# Patient Record
Sex: FEMALE
[De-identification: names, ages, dates, MRNs, and addresses within clinical notes are randomized per-mention and may not be internally consistent; named-entity substitution may affect disease eponyms.]

---

## 2023-10-30 PROBLEM — Z00.00 ENCOUNTER FOR PREVENTIVE HEALTH EXAMINATION: Status: ACTIVE | Noted: 2023-10-30

## 2023-11-02 ENCOUNTER — APPOINTMENT (OUTPATIENT)
Dept: ULTRASOUND IMAGING | Facility: CLINIC | Age: 53
End: 2023-11-02

## 2023-11-02 ENCOUNTER — APPOINTMENT (OUTPATIENT)
Dept: MRI IMAGING | Facility: CLINIC | Age: 53
End: 2023-11-02

## 2023-11-02 ENCOUNTER — APPOINTMENT (OUTPATIENT)
Dept: MAMMOGRAPHY | Facility: CLINIC | Age: 53
End: 2023-11-02
Payer: MEDICAID

## 2023-11-02 PROCEDURE — A9585: CPT

## 2023-11-02 PROCEDURE — 70553 MRI BRAIN STEM W/O & W/DYE: CPT

## 2023-11-02 PROCEDURE — 77063 BREAST TOMOSYNTHESIS BI: CPT

## 2023-11-02 PROCEDURE — 76641 ULTRASOUND BREAST COMPLETE: CPT | Mod: 50

## 2023-11-02 PROCEDURE — 77067 SCR MAMMO BI INCL CAD: CPT

## 2023-11-02 PROCEDURE — 76700 US EXAM ABDOM COMPLETE: CPT

## 2023-11-07 ENCOUNTER — APPOINTMENT (OUTPATIENT)
Dept: ULTRASOUND IMAGING | Facility: CLINIC | Age: 53
End: 2023-11-07
Payer: MEDICAID

## 2023-11-07 PROCEDURE — 93970 EXTREMITY STUDY: CPT

## 2023-11-15 ENCOUNTER — APPOINTMENT (OUTPATIENT)
Dept: ULTRASOUND IMAGING | Facility: CLINIC | Age: 53
End: 2023-11-15
Payer: MEDICAID

## 2023-11-15 PROCEDURE — 76642 ULTRASOUND BREAST LIMITED: CPT | Mod: LT

## 2023-12-05 ENCOUNTER — APPOINTMENT (OUTPATIENT)
Dept: NEUROSURGERY | Facility: CLINIC | Age: 53
End: 2023-12-05
Payer: MEDICAID

## 2023-12-05 VITALS
DIASTOLIC BLOOD PRESSURE: 78 MMHG | RESPIRATION RATE: 16 BRPM | OXYGEN SATURATION: 98 % | HEIGHT: 66 IN | HEART RATE: 74 BPM | WEIGHT: 211.64 LBS | SYSTOLIC BLOOD PRESSURE: 126 MMHG | BODY MASS INDEX: 34.01 KG/M2

## 2023-12-05 PROCEDURE — 99203 OFFICE O/P NEW LOW 30 MIN: CPT

## 2023-12-12 ENCOUNTER — APPOINTMENT (OUTPATIENT)
Dept: RADIATION ONCOLOGY | Facility: CLINIC | Age: 53
End: 2023-12-12
Payer: MEDICAID

## 2023-12-12 VITALS
TEMPERATURE: 97 F | HEIGHT: 66 IN | RESPIRATION RATE: 15 BRPM | BODY MASS INDEX: 33.91 KG/M2 | OXYGEN SATURATION: 97 % | DIASTOLIC BLOOD PRESSURE: 77 MMHG | WEIGHT: 211 LBS | HEART RATE: 76 BPM | SYSTOLIC BLOOD PRESSURE: 113 MMHG

## 2023-12-12 DIAGNOSIS — Z80.42 FAMILY HISTORY OF MALIGNANT NEOPLASM OF PROSTATE: ICD-10-CM

## 2023-12-12 DIAGNOSIS — Z78.9 OTHER SPECIFIED HEALTH STATUS: ICD-10-CM

## 2023-12-12 DIAGNOSIS — Z92.3 PERSONAL HISTORY OF IRRADIATION: ICD-10-CM

## 2023-12-12 DIAGNOSIS — F17.200 NICOTINE DEPENDENCE, UNSPECIFIED, UNCOMPLICATED: ICD-10-CM

## 2023-12-12 PROCEDURE — 99205 OFFICE O/P NEW HI 60 MIN: CPT

## 2023-12-12 PROCEDURE — T1013A: CUSTOM

## 2023-12-14 NOTE — ASSESSMENT
[FreeTextEntry1] : Ms. Garrison presents with above history and imaging. She is neurologically intact,  I have personally reviewed her MRI head w/wo contrast  for meningioma     Plan: Radiation oncology with Dr. Haas f/u as needed Patient has been given an opportunity to ask and have their questions answered to their satisfaction. Patient knows to call the office if there are any new or worsening symptoms.

## 2023-12-14 NOTE — HISTORY OF PRESENT ILLNESS
[FreeTextEntry1] : headache [de-identified] : SHERON DODD is a 53 year old female presents for initial neurosurgical evaluation of meningioma.  53 year old woman presents today for consultation regarding management for meningioma.  Her past medical history is significant for leukemia at age 9. Unclear whether she received cranial irradiation at that time.  She reports presenting with severe headaches in 2017, at which time she was diagnosed with meningioma.  She underwent neurosurgical resection of a reportedly large meningioma along the falx, and reports that it was a subtotal resection due to nearby vasculature. She had a copy of the pathology report on her phone, which reported meningioma WHO 1.  She additionally underwent radiosurgery in 2018. She describes this as a 1 day treatment, during which a frame was secured to her head. She is otherwise not aware of the specifics of the treatment received.

## 2023-12-14 NOTE — DATA REVIEWED
[de-identified] : EXAM: [id number] - MR BRAIN WAW IC - ORDERED BY: CANDELARIA MICHAEL   PROCEDURE DATE: 11/02/2023    INTERPRETATION: CLINICAL INDICATION: History of meningioma.  TECHNIQUE: Multi-planar multi-sequential MR imaging of the brain was performed before and after the intravenous administration of contrast. Gadavist IV contrast dose: 10 cc administered.  COMPARISON: Outside MRI brain 2/24/2022.  FINDINGS: Posterior midline craniotomy noted for prior lesion resection. There is mild increase in size of residual enhancing parasagittal lesion measuring up to 3.0 x 1.5 cm in axial plane, previously 2.6 x 1.4 cm. There is complete invasion of the middle third superior sagittal sinus again noted. Adjacent parenchymal encephalomalacia/gliosis is again seen.  1.1 x 1.0 cm enhancing lesion along the medial aspect of the right tentorium, grossly stable to slightly increased in size (11-10).  0.4 x 0.4 cm enhancing posterior parafalcine lesion, grossly stable (11-14, 101-68).  1.4 x 0.6 cm T1 hyperintense anterior parafalcine lesion is most compatible with lipoma or osteoma, grossly stable (11-17).  No acute infarct or intracranial hemorrhage. No hydrocephalus. No extra-axial fluid collections. The skull base flow voids are present.  The visualized intraorbital contents are normal. The imaged portions of the paranasal sinuses are clear. The mastoid air cells are clear.  IMPRESSION:  Mild increase in size of posterior parasagittal lesion which invades the superior sagittal sinus.  Grossly stable to slight increase size of 1.1 cm right tentorial lesion.  Grossly stable 0.4 cm posterior parafalcine lesion.

## 2023-12-21 ENCOUNTER — APPOINTMENT (OUTPATIENT)
Dept: NEUROLOGY | Facility: CLINIC | Age: 53
End: 2023-12-21

## 2024-02-20 ENCOUNTER — APPOINTMENT (OUTPATIENT)
Dept: NEUROSURGERY | Facility: CLINIC | Age: 54
End: 2024-02-20
Payer: COMMERCIAL

## 2024-02-20 VITALS
SYSTOLIC BLOOD PRESSURE: 113 MMHG | HEART RATE: 67 BPM | RESPIRATION RATE: 16 BRPM | OXYGEN SATURATION: 98 % | DIASTOLIC BLOOD PRESSURE: 78 MMHG | HEIGHT: 66 IN | BODY MASS INDEX: 31.82 KG/M2 | TEMPERATURE: 97 F | WEIGHT: 198 LBS

## 2024-02-20 DIAGNOSIS — D32.9 BENIGN NEOPLASM OF MENINGES, UNSPECIFIED: ICD-10-CM

## 2024-02-20 PROCEDURE — 99214 OFFICE O/P EST MOD 30 MIN: CPT

## 2024-02-21 NOTE — REASON FOR VISIT
[Follow-Up: _____] : a [unfilled] follow-up visit [New Patient Visit] : a new patient visit [Referred By: _________] : Patient was referred by SUSANA [FreeTextEntry1] : meningioma

## 2024-02-21 NOTE — DATA REVIEWED
[de-identified] : EXAM: [id number] - MR BRAIN WAW IC - ORDERED BY: CANDELARIA MICHAEL   PROCEDURE DATE: 11/02/2023    INTERPRETATION: CLINICAL INDICATION: History of meningioma.  TECHNIQUE: Multi-planar multi-sequential MR imaging of the brain was performed before and after the intravenous administration of contrast. Gadavist IV contrast dose: 10 cc administered.  COMPARISON: Outside MRI brain 2/24/2022.  FINDINGS: Posterior midline craniotomy noted for prior lesion resection. There is mild increase in size of residual enhancing parasagittal lesion measuring up to 3.0 x 1.5 cm in axial plane, previously 2.6 x 1.4 cm. There is complete invasion of the middle third superior sagittal sinus again noted. Adjacent parenchymal encephalomalacia/gliosis is again seen.  1.1 x 1.0 cm enhancing lesion along the medial aspect of the right tentorium, grossly stable to slightly increased in size (11-10).  0.4 x 0.4 cm enhancing posterior parafalcine lesion, grossly stable (11-14, 101-68).  1.4 x 0.6 cm T1 hyperintense anterior parafalcine lesion is most compatible with lipoma or osteoma, grossly stable (11-17).  No acute infarct or intracranial hemorrhage. No hydrocephalus. No extra-axial fluid collections. The skull base flow voids are present.  The visualized intraorbital contents are normal. The imaged portions of the paranasal sinuses are clear. The mastoid air cells are clear.  IMPRESSION:  Mild increase in size of posterior parasagittal lesion which invades the superior sagittal sinus.  Grossly stable to slight increase size of 1.1 cm right tentorial lesion.  Grossly stable 0.4 cm posterior parafalcine lesion.

## 2024-02-21 NOTE — REVIEW OF SYSTEMS
[As Noted in HPI] : as noted in HPI [Negative] : Heme/Lymph [Numbness] : no numbness [Tingling] : no tingling [Difficulty Walking] : no difficulty walking

## 2024-02-21 NOTE — HISTORY OF PRESENT ILLNESS
[FreeTextEntry1] : headache [de-identified] : SHERON DODD is a 53 year old female presents for initial neurosurgical evaluation of meningioma.  53 year old woman presents today for consultation regarding management for meningioma.  Her past medical history is significant for leukemia at age 9. Unclear whether she received cranial irradiation at that time.  She reports presenting with severe headaches in 2017, at which time she was diagnosed with meningioma.  She underwent neurosurgical resection of a reportedly large meningioma along the falx, and reports that it was a subtotal resection due to nearby vasculature. She had a copy of the pathology report on her phone, which reported meningioma WHO 1.  She additionally underwent radiosurgery in 2018. She describes this as a 1 day treatment, during which a frame was secured to her head. She is otherwise not aware of the specifics of the treatment received.

## 2024-02-21 NOTE — PHYSICAL EXAM
[General Appearance - Alert] : alert [General Appearance - In No Acute Distress] : in no acute distress [Oriented To Time, Place, And Person] : oriented to person, place, and time [Impaired Insight] : insight and judgment were intact [Affect] : the affect was normal [Person] : oriented to person [Place] : oriented to place [Time] : oriented to time [Short Term Intact] : short term memory intact [Remote Intact] : remote memory intact [Span Intact] : the attention span was normal [Concentration Intact] : normal concentrating ability [Fluency] : fluency intact [Comprehension] : comprehension intact [Current Events] : adequate knowledge of current events [Past History] : adequate knowledge of personal past history [Vocabulary] : adequate range of vocabulary [Cranial Nerves Optic (II)] : visual acuity intact bilaterally,  pupils equal round and reactive to light [Cranial Nerves Oculomotor (III)] : extraocular motion intact [Cranial Nerves Trigeminal (V)] : facial sensation intact symmetrically [Cranial Nerves Facial (VII)] : face symmetrical [Cranial Nerves Vestibulocochlear (VIII)] : hearing was intact bilaterally [Cranial Nerves Glossopharyngeal (IX)] : tongue and palate midline [Cranial Nerves Accessory (XI - Cranial And Spinal)] : head turning and shoulder shrug symmetric [Cranial Nerves Hypoglossal (XII)] : there was no tongue deviation with protrusion [Motor Tone] : muscle tone was normal in all four extremities [Motor Strength] : muscle strength was normal in all four extremities [No Muscle Atrophy] : normal bulk in all four extremities [Sensation Tactile Decrease] : light touch was intact [Abnormal Walk] : normal gait [Balance] : balance was intact [2+] : Patella left 2+ [Antalgic] : antalgic [Past-pointing] : there was no past-pointing [Tremor] : no tremor present

## 2024-02-21 NOTE — ASSESSMENT
[FreeTextEntry1] : Ms. Garrison presents with above history and imaging. She is neurologically intact,  I have personally reviewed her MRI head w/wo contrast  for meningioma and MR Venogram given it invades sagittal sinus.  Awaiting records from [facility] and will coordinate care with Dr. Haas and Dr. Aaron.     Plan: Radiation oncology with Dr. Haas f/u after imaging.  Patient has been given an opportunity to ask and have their questions answered to their satisfaction. Patient knows to call the office if there are any new or worsening symptoms.   I, Dr. Fito Perdue, personally performed the evaluation and management (E/M) services for this established patient who presents today. That E/M includes conducting the clinically appropriate interval history &/or exam and establishing a continued plan of care. Today, my SANJANA, Esme Mcclain, was here to observe my evaluation and management service for this patient and follow the plan of care established by me going forward.

## 2024-03-04 ENCOUNTER — OUTPATIENT (OUTPATIENT)
Dept: OUTPATIENT SERVICES | Facility: HOSPITAL | Age: 54
LOS: 1 days | End: 2024-03-04
Payer: COMMERCIAL

## 2024-03-04 ENCOUNTER — RESULT REVIEW (OUTPATIENT)
Age: 54
End: 2024-03-04

## 2024-03-04 ENCOUNTER — APPOINTMENT (OUTPATIENT)
Dept: MRI IMAGING | Facility: CLINIC | Age: 54
End: 2024-03-04

## 2024-03-04 DIAGNOSIS — D32.9 BENIGN NEOPLASM OF MENINGES, UNSPECIFIED: ICD-10-CM

## 2024-03-04 PROCEDURE — 70553 MRI BRAIN STEM W/O & W/DYE: CPT | Mod: 26

## 2024-03-04 PROCEDURE — 70546 MR ANGIOGRAPH HEAD W/O&W/DYE: CPT

## 2024-03-04 PROCEDURE — 70546 MR ANGIOGRAPH HEAD W/O&W/DYE: CPT | Mod: 26,59

## 2024-03-04 PROCEDURE — 70553 MRI BRAIN STEM W/O & W/DYE: CPT

## 2024-03-04 PROCEDURE — A9585: CPT

## 2024-03-07 ENCOUNTER — NON-APPOINTMENT (OUTPATIENT)
Age: 54
End: 2024-03-07

## 2024-04-01 ENCOUNTER — NON-APPOINTMENT (OUTPATIENT)
Age: 54
End: 2024-04-01

## 2024-04-01 ENCOUNTER — APPOINTMENT (OUTPATIENT)
Dept: OPHTHALMOLOGY | Facility: CLINIC | Age: 54
End: 2024-04-01
Payer: COMMERCIAL

## 2024-04-01 PROCEDURE — 92004 COMPRE OPH EXAM NEW PT 1/>: CPT

## 2024-06-12 ENCOUNTER — NON-APPOINTMENT (OUTPATIENT)
Age: 54
End: 2024-06-12

## 2024-07-24 ENCOUNTER — APPOINTMENT (OUTPATIENT)
Dept: RADIATION ONCOLOGY | Facility: CLINIC | Age: 54
End: 2024-07-24

## 2024-07-24 NOTE — HISTORY OF PRESENT ILLNESS
[FreeTextEntry1] : 54 year old woman returns today for reevaluation and further discussion regarding management for meningioma.  Recall her past medical history is significant for leukemia at age 9. Unclear whether she received cranial irradiation at that time.  She reports presenting with severe headaches in 2017, at which time she was diagnosed with meningioma.  She underwent neurosurgical resection of a reportedly large meningioma along the falx, and reports that it was a subtotal resection due to nearby vasculature. She had a copy of the pathology report on her phone, which reported meningioma WHO 1.  She additionally underwent radiosurgery in 2018. She describes this as a 1 day treatment, during which a frame was secured to her head. She is otherwise not aware of the specifics of the treatment received.  She was monitored radiographically.  She recently moved to the [place], and is establishing care locally.  She met with Dr. Fito Perdue on 12/5/23.  11/2/23 MRI brain was compared to imaging from 2022 (from [place]), and showed mild increase in size of residual enhancing parasagittal lesion measuring up to 3.0 x 1.5 cm, previously 2.6 x 1.4 cm, with invasion of the middle third superior sagittal sinus. There was adjacent parenchymal encephalomalacia/gliosis. There was also a 1.1 x 1.0 cm enhancing lesion along the medial aspect of the right tentorium, grossly stable to slightly increased. Also present was a 0.4 x 0.4 enhancing posterior parafalcine lesion. There was a 1.4 x 0.6 cm anterior parafalcine lesion, compatible with lipoma or osteoma, grossly stable.  Interval history 3/4/24 MRI head: IMPRESSION: MRI 1.  Multiple meningiomas as described which appear grossly stable in size. 2.  No acute infarct or hemorrhage. MRV There is loss of flow-related signal within the mid to posterior aspect of the superior sagittal sinus in the region of the meningioma, suggesting occlusion due to tumor invasion and/or thrombosis.  headaches nausea, vomiting, changes in vision, changes in hearing, changes in motor strength, dizziness, or lightheadedness. She notes residual decreased sensation along the lateral aspect of her thighs bilaterally, since her surgery, unchanged.  Care team: Aravind Tinajero

## 2024-07-24 NOTE — HISTORY OF PRESENT ILLNESS
[FreeTextEntry1] : 54 year old woman returns today for reevaluation and further discussion regarding management for meningioma.  Recall her past medical history is significant for leukemia at age 9. Unclear whether she received cranial irradiation at that time.  She reports presenting with severe headaches in 2017, at which time she was diagnosed with meningioma.  She underwent neurosurgical resection of a reportedly large meningioma along the falx, and reports that it was a subtotal resection due to nearby vasculature. She had a copy of the pathology report on her phone, which reported meningioma WHO 1.  She additionally underwent radiosurgery in 2018. She describes this as a 1 day treatment, during which a frame was secured to her head. She is otherwise not aware of the specifics of the treatment received.  She was monitored radiographically.  [place] recently [place] the [place], and is establishing care locally.  She met with Dr. Fito Perdue on 12/5/23.  11/2/23 MRI brain was compared to imaging from 2022 (from [place]), and showed mild increase in size of residual enhancing parasagittal lesion measuring up to 3.0 x 1.5 cm, previously 2.6 x 1.4 cm, with invasion of the middle third superior sagittal sinus. There was adjacent parenchymal encephalomalacia/gliosis. There was also a 1.1 x 1.0 cm enhancing lesion along the medial aspect of the right tentorium, grossly stable to slightly increased. Also present was a 0.4 x 0.4 enhancing posterior parafalcine lesion. There was a 1.4 x 0.6 cm anterior parafalcine lesion, compatible with lipoma or osteoma, grossly stable.  Interval history 3/4/24 MRI head: IMPRESSION: MRI 1.  Multiple meningiomas as described which appear grossly stable in size. 2.  No acute infarct or hemorrhage. MRV There is loss of flow-related signal within the mid to posterior aspect of the superior sagittal sinus in the region of the meningioma, suggesting occlusion due to tumor invasion and/or thrombosis.  headaches nausea, vomiting, changes in vision, changes in hearing, changes in motor strength, dizziness, or lightheadedness. She notes residual decreased sensation along the lateral aspect of her thighs bilaterally, since her surgery, unchanged.  Care team: Aravind Tinajero

## 2024-07-24 NOTE — HISTORY OF PRESENT ILLNESS
[FreeTextEntry1] : 54 year old woman returns today for reevaluation and further discussion regarding management for meningioma.  Recall her past medical history is significant for leukemia at age 9. Unclear whether she received cranial irradiation at that time.  She reports presenting with severe headaches in 2017, at which time she was diagnosed with meningioma.  She underwent neurosurgical resection of a reportedly large meningioma along the falx, and reports that it was a subtotal resection due to nearby vasculature. She had a copy of the pathology report on her phone, which reported meningioma WHO 1.  She additionally underwent radiosurgery in 2018. She describes this as a 1 day treatment, during which a frame was secured to her head. She is otherwise not aware of the specifics of the treatment received.  She was monitored radiographically.  [place] recently [place] the [place], and is establishing care locally.  She met with Dr. Fito Perdue on 12/5/23.  11/2/23 MRI brain was compared to imaging from 2022 (from [place]), and showed mild increase in size of residual enhancing parasagittal lesion measuring up to 3.0 x 1.5 cm, previously 2.6 x 1.4 cm, with invasion of the middle third superior sagittal sinus. There was adjacent parenchymal encephalomalacia/gliosis. There was also a 1.1 x 1.0 cm enhancing lesion along the medial aspect of the right tentorium, grossly stable to slightly increased. Also present was a 0.4 x 0.4 enhancing posterior parafalcine lesion. There was a 1.4 x 0.6 cm anterior parafalcine lesion, compatible with lipoma or osteoma, grossly stable.  Interval history 3/4/24 MRI head: IMPRESSION: MRI 1.  Multiple meningiomas as described which appear grossly stable in size. 2.  No acute infarct or hemorrhage. MRV There is loss of flow-related signal within the mid to posterior aspect of the superior sagittal sinus in the region of the meningioma, suggesting occlusion due to tumor invasion and/or thrombosis.  headaches nausea, vomiting, changes in vision, changes in hearing, changes in motor strength, dizziness, or lightheadedness. She notes residual decreased sensation along the lateral aspect of her thighs bilaterally, since her surgery, unchanged.  Care team: Aravind Tinajero